# Patient Record
Sex: FEMALE | Race: WHITE | Employment: FULL TIME | URBAN - METROPOLITAN AREA
[De-identification: names, ages, dates, MRNs, and addresses within clinical notes are randomized per-mention and may not be internally consistent; named-entity substitution may affect disease eponyms.]

---

## 2017-04-19 ENCOUNTER — HOSPITAL ENCOUNTER (OUTPATIENT)
Dept: MAMMOGRAPHY | Age: 42
Discharge: HOME | End: 2017-04-19
Attending: FAMILY MEDICINE
Payer: COMMERCIAL

## 2017-04-19 DIAGNOSIS — Z12.31 VISIT FOR SCREENING MAMMOGRAM: ICD-10-CM

## 2017-04-19 PROCEDURE — 77067 SCR MAMMO BI INCL CAD: CPT

## 2018-04-20 ENCOUNTER — HOSPITAL ENCOUNTER (OUTPATIENT)
Dept: MAMMOGRAPHY | Age: 43
Discharge: HOME | End: 2018-04-20
Attending: FAMILY MEDICINE
Payer: COMMERCIAL

## 2018-04-20 DIAGNOSIS — Z12.31 VISIT FOR SCREENING MAMMOGRAM: ICD-10-CM

## 2018-04-20 PROCEDURE — 77063 BREAST TOMOSYNTHESIS BI: CPT

## 2019-07-03 ENCOUNTER — HOSPITAL ENCOUNTER (OUTPATIENT)
Dept: ULTRASOUND IMAGING | Age: 44
Discharge: HOME | End: 2019-07-03
Attending: FAMILY MEDICINE
Payer: COMMERCIAL

## 2019-07-03 DIAGNOSIS — R79.89 ABNORMAL LFTS: ICD-10-CM

## 2019-07-03 PROCEDURE — 76705 ECHO EXAM OF ABDOMEN: CPT

## 2019-10-15 ENCOUNTER — OFFICE VISIT (OUTPATIENT)
Dept: ORTHOPEDIC SURGERY | Age: 44
End: 2019-10-15

## 2019-10-15 VITALS
HEIGHT: 67 IN | HEART RATE: 91 BPM | RESPIRATION RATE: 16 BRPM | SYSTOLIC BLOOD PRESSURE: 156 MMHG | OXYGEN SATURATION: 98 % | WEIGHT: 226 LBS | BODY MASS INDEX: 35.47 KG/M2 | DIASTOLIC BLOOD PRESSURE: 96 MMHG

## 2019-10-15 DIAGNOSIS — M54.50 LUMBAR PAIN: Primary | ICD-10-CM

## 2019-10-15 DIAGNOSIS — M62.838 MUSCLE SPASM: ICD-10-CM

## 2019-10-15 DIAGNOSIS — R79.89 ELEVATED LFTS: ICD-10-CM

## 2019-10-15 DIAGNOSIS — M51.36 DDD (DEGENERATIVE DISC DISEASE), LUMBAR: ICD-10-CM

## 2019-10-15 DIAGNOSIS — M54.16 LUMBAR RADICULOPATHY: ICD-10-CM

## 2019-10-15 DIAGNOSIS — M79.2 NEURITIS: ICD-10-CM

## 2019-10-15 DIAGNOSIS — M53.3 SACROILIAC PAIN: ICD-10-CM

## 2019-10-15 PROBLEM — E66.01 SEVERE OBESITY (HCC): Status: ACTIVE | Noted: 2019-10-15

## 2019-10-15 NOTE — PATIENT INSTRUCTIONS
Low Back Arthritis: Exercises  Introduction  Here are some examples of typical rehabilitation exercises for your condition. Start each exercise slowly. Ease off the exercise if you start to have pain. Your doctor or physical therapist will tell you when you can start these exercises and which ones will work best for you. When you are not being active, find a comfortable position for rest. Some people are comfortable on the floor or a medium-firm bed with a small pillow under their head and another under their knees. Some people prefer to lie on their side with a pillow between their knees. Don't stay in one position for too long. Take short walks (10 to 20 minutes) every 2 to 3 hours. Avoid slopes, hills, and stairs until you feel better. Walk only distances you can manage without pain, especially leg pain. How to do the exercises  Pelvic tilt    1. Lie on your back with your knees bent. 2. \"Brace\" your stomach--tighten your muscles by pulling in and imagining your belly button moving toward your spine. 3. Press your lower back into the floor. You should feel your hips and pelvis rock back. 4. Hold for 6 seconds while breathing smoothly. 5. Relax and allow your pelvis and hips to rock forward. 6. Repeat 8 to 12 times. Back stretches    1. Get down on your hands and knees on the floor. 2. Relax your head and allow it to droop. Round your back up toward the ceiling until you feel a nice stretch in your upper, middle, and lower back. Hold this stretch for as long as it feels comfortable, or about 15 to 30 seconds. 3. Return to the starting position with a flat back while you are on your hands and knees. 4. Let your back sway by pressing your stomach toward the floor. Lift your buttocks toward the ceiling. 5. Hold this position for 15 to 30 seconds. 6. Repeat 2 to 4 times. Follow-up care is a key part of your treatment and safety.  Be sure to make and go to all appointments, and call your doctor if you are having problems. It's also a good idea to know your test results and keep a list of the medicines you take. Where can you learn more? Go to http://fercho-iris.info/. Enter O860 in the search box to learn more about \"Low Back Arthritis: Exercises. \"  Current as of: June 26, 2019  Content Version: 12.2  © 5413-0527 [employer], [employer]. Care instructions adapted under license by [employer] (which disclaims liability or warranty for this information). If you have questions about a medical condition or this instruction, always ask your healthcare professional. [address] any warranty or liability for your use of this information.

## 2019-10-15 NOTE — PROGRESS NOTES
[facility]  [address] [place], [address]  Phone: [phone]  Fax: [phone]    NEW PATIENT  Pt's YOB: 1975    ASSESSMENT   Diagnoses and all orders for this visit:    1. Lumbar pain  -     AMB POC XRAY, SPINE, LUMBOSACRAL; 4+  -     REFERRAL TO PHYSICAL THERAPY  -     MRI LUMB SPINE WO CONT; Future    2. DDD (degenerative disc disease), lumbar  -     REFERRAL TO PHYSICAL THERAPY  -     MRI LUMB SPINE WO CONT; Future    3. Lumbar radiculopathy  -     REFERRAL TO PHYSICAL THERAPY  -     MRI LUMB SPINE WO CONT; Future    4. Muscle spasm  -     REFERRAL TO PHYSICAL THERAPY  -     MRI LUMB SPINE WO CONT; Future    5. Sacroiliac pain  -     REFERRAL TO PHYSICAL THERAPY  -     MRI LUMB SPINE WO CONT; Future    6. Neuritis  -     REFERRAL TO PHYSICAL THERAPY  -     MRI LUMB SPINE WO CONT; Future    7. Elevated LFTs         IMPRESSION AND PLAN:  Leida Barcenas is a 38 y.o. female with history of lumbar pain. She complains of progressive weakness in the legs since she returned back to [facility] at the end of the summer. Pt notes that she is scheduled for a lower extremity EMG/NCS next week. She reports minimal relief with previous steroid injections and notes that she has not recently attended physical therapy. 1) Pt was given information on lumbar arthritis exercises. 2) A lumbar MRI was ordered. She has progressive leg weakness, difficulty standing/walking, has a previous history of a HNP, and is completing physical therapy. 3) She was referred to physical therapy. 4) Pt is not a candidate for medications at this time due to elevated LFTs. 5) I recommended the patient try OTC Aspercaine, Salonpas, or capsaicin patches if needed. 6) Ms. Alex Wade has a reminder for a \"due or due soon\" health maintenance.  I have asked that she contact her primary care provider, Leonidas Hernandez MD, for follow-up on this health maintenance. 7)  demonstrated consistency with prescribing. Follow-up and Dispositions    · Return in about 3 weeks (around 11/5/2019) for Diagnostic Test follow up, PT follow up. HISTORY OF PRESENT ILLNESS:  Inez Alcocer is a 38 y.o. female with history of lumbar pain. Pt presents to the office today as a new patient and was last seen on 04/06/2016. She complains of progressive weakness in the legs. Pt admits to pain in the lower back but notes that her leg weakness is most severe at this time. Of note, she is a an assistant . She notes that she started to experience weakness in both legs at the beginning of summer but this improved when she was off during the summer. 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 Pt reports that her job requires lifting and prolonged standing/walking. She admits to weakness in the legs when changing positions and notes that she occasional feels like she is going to fall onto a child while at work. Pt notes that she is scheduled for a lower extremity EMG/NCS next week. She reports minimal relief with previous steroid injections and notes that she has not recently attended physical therapy. Pt admits to elevated LFTs and notes that she has been instructed to avoid pain medication and alcohol. She notes that she discontinued the Neurontin since her last office visit. Pt at this time desires to proceed with a lumbar MRI and physical therapy. Of note, she is a nonsmoker.      Pain Scale: 6/10     PCP: Youlanda Jeans, MD    Past Medical History:   Diagnosis Date    Female stress incontinence     HTN (hypertension)     Hypercholesterolemia         Social History     Socioeconomic History    Marital status:      Spouse name: Not on file    Number of children: Not on file    Years of education: Not on file    Highest education level: Not on file   Occupational History    Not on file   Social Needs    Financial resource strain: Not on file    Food insecurity:     Worry: Not on file     Inability: Not on file    Transportation needs:     Medical: Not on file     Non-medical: Not on file   Tobacco Use    Smoking status: Never Smoker    Smokeless tobacco: Never Used   Substance and Sexual Activity    Alcohol use: Yes     Comment: on occ.  Drug use: No    Sexual activity: Not on file   Lifestyle    Physical activity:     Days per week: Not on file     Minutes per session: Not on file    Stress: Not on file   Relationships    Social connections:     Talks on phone: Not on file     Gets together: Not on file     Attends Caodaism service: Not on file     Active member of club or organization: Not on file     Attends meetings of clubs or organizations: Not on file     Relationship status: Not on file    Intimate partner violence:     Fear of current or ex partner: Not on file     Emotionally abused: Not on file     Physically abused: Not on file     Forced sexual activity: Not on file   Other Topics Concern    Not on file   Social History Narrative    Not on file       Current Outpatient Medications   Medication Sig Dispense Refill    losartan-hydroCHLOROthiazide (HYZAAR) 100-12.5 mg per tablet Take 1 Tab by mouth daily.  atorvastatin (LIPITOR) 20 mg tablet Take 20 mg by mouth daily.  aspirin delayed-release 81 mg tablet Take  by mouth daily.  Calcium-Cholecalciferol, D3, 600 mg(1,500mg) -400 unit cap Take  by mouth.  amLODIPine (NORVASC) 5 mg tablet Take 5 mg by mouth daily.  metoprolol succinate (TOPROL-XL) 100 mg XL tablet 100 mg daily. Allergies   Allergen Reactions    Meperidine Other (comments)     GI upset  Other reaction(s): gi distress  Hives. REVIEW OF SYSTEMS    Constitutional: Negative for fever or chills. Positive for 19 lb weight gain. Respiratory: Negative for cough or shortness of breath.      Cardiovascular: Negative for chest pain or palpitations. Gastrointestinal: Negative for acid reflux, change in bowel habits, or constipation. Genitourinary: Negative for dysuria and flank pain. Musculoskeletal: Positive for lumbar pain and bilateral leg weakness. Skin: Negative for rash. Neurological: Negative for headaches, dizziness, or numbness. Endo/Heme/Allergies: Negative for increased bruising. Psychiatric/Behavioral: Negative for difficulty with sleep. PHYSICAL EXAMINATION  Visit Vitals  BP (!) 156/96   Pulse 91   Resp 16   Ht 5' 7\" (1.702 m)   Wt 226 lb (102.5 kg)   SpO2 98%   BMI 35.40 kg/m²       Constitutional: Awake, alert, and in no acute distress. HEENT: Normocephalic. Atraumatic. Oropharynx is moist and clear. PERRL. EOMI. Sclerae are nonicteric  Cardiovascular: Regular rate and rhythm  Lungs: Clear to auscultation bilaterally  Abdomen: Soft and nontender. Bowel sounds are present  Neurological: 1+ symmetrical DTRs in the upper extremities. 1+ symmetrical DTRs in the lower extremities. Sensation to light touch is intact. Negative Hahn's sign bilaterally. Skin: warm, dry, and intact. Musculoskeletal: Tenderness to palpation in the lower lumbar region and over the right sacroiliac joint. Decreased range of motion with the KINGS's on the left; good range of motion on the right. Moderate pain with extension, axial loading, or forward flexion. No pain with internal or external rotation of her hips. Positive straight leg raise on the right. Difficulty and pain with toe and heel walking. No difficulty with the single leg stand bilaterally.       Biceps  Triceps Deltoids Wrist Ext Wrist Flex Hand Intrin   Right +4/5 +4/5 +4/5 +4/5 +4/5 +4/5   Left +4/5 +4/5 +4/5 +4/5 +4/5 +4/5      Hip Flex  Quads Hamstrings Ankle DF EHL Ankle PF   Right +4/5 +4/5 +4/5 +4/5 +4/5 +4/5   Left +4/5 +4/5 +4/5 +4/5 +4/5 +4/5     IMAGING:    Lumbar spine 4V x-rays from 10/15/2019 were personally reviewed with the patient and demonstrated:  Multilevel degenerative facets. Degenerative changes at L5-S1. Atherosclerosis in the mid aortic region. Listhesis at L5-S1. No instability. Written by Larissa Courtney, as dictated by Reji Richmond MD.  I, Dr. Reji Richmond confirm that all documentation is accurate.

## 2019-10-17 ENCOUNTER — HOSPITAL ENCOUNTER (OUTPATIENT)
Dept: MAMMOGRAPHY | Age: 44
Discharge: HOME | End: 2019-10-17
Attending: FAMILY MEDICINE
Payer: COMMERCIAL

## 2019-10-17 DIAGNOSIS — Z12.31 VISIT FOR SCREENING MAMMOGRAM: ICD-10-CM

## 2019-10-17 PROCEDURE — 77067 SCR MAMMO BI INCL CAD: CPT

## 2019-10-23 ENCOUNTER — TELEPHONE (OUTPATIENT)
Dept: ORTHOPEDIC SURGERY | Age: 44
End: 2019-10-23

## 2019-10-23 NOTE — TELEPHONE ENCOUNTER
MRI LUMB SPINE WO CONT [id number] (Order [id number])     Received call from [facility] radiology. He wanted to know if we were going to obtain the pre-auth needed for patient's MRI tomorrow 10/24/19. I advised would have clinical review and call back to advise accordingly.       Cesar can be reached at: [phone]

## 2019-10-23 NOTE — TELEPHONE ENCOUNTER
LM with Cesar in [place] to call me back as to whether a P2P is needed or what he means by pre -auth because Tye usually does the Pre-auth for the MRI?

## 2019-10-24 ENCOUNTER — HOSPITAL ENCOUNTER (OUTPATIENT)
Dept: PHYSICAL THERAPY | Age: 44
Discharge: HOME | End: 2019-10-24
Payer: COMMERCIAL

## 2019-10-24 ENCOUNTER — HOSPITAL ENCOUNTER (OUTPATIENT)
Age: 44
Discharge: HOME | End: 2019-10-24
Attending: PHYSICAL MEDICINE & REHABILITATION
Payer: COMMERCIAL

## 2019-10-24 DIAGNOSIS — M62.838 MUSCLE SPASM: ICD-10-CM

## 2019-10-24 DIAGNOSIS — M51.36 DDD (DEGENERATIVE DISC DISEASE), LUMBAR: ICD-10-CM

## 2019-10-24 DIAGNOSIS — M54.16 LUMBAR RADICULOPATHY: ICD-10-CM

## 2019-10-24 DIAGNOSIS — M54.50 LUMBAR PAIN: ICD-10-CM

## 2019-10-24 DIAGNOSIS — M53.3 SACROILIAC PAIN: ICD-10-CM

## 2019-10-24 DIAGNOSIS — M79.2 NEURITIS: ICD-10-CM

## 2019-10-24 PROCEDURE — 97161 PT EVAL LOW COMPLEX 20 MIN: CPT

## 2019-10-24 PROCEDURE — 97110 THERAPEUTIC EXERCISES: CPT

## 2019-10-24 PROCEDURE — 72148 MRI LUMBAR SPINE W/O DYE: CPT

## 2019-10-24 PROCEDURE — 97140 MANUAL THERAPY 1/> REGIONS: CPT

## 2019-10-24 NOTE — PROGRESS NOTES
[facility]  [address] [facility] [address]  [place], [address]  [phone] fax    Plan of Care/ Statement of Necessity for Physical Therapy Services  Patient name: Gómez Carroll Start of Care: 10/24/2019   Referral source: Merrill Alpers, MD : 1975    Medical Diagnosis: Low back pain [M54.5]  Sacrococcygeal disorders, not elsewhere classified [M53.3]  Other muscle spasm [M62.838]  Payor: BLUE CROSS / Plan: [employer] [address] [id number] PPO / Product Type: PPO /  Onset Date:    Treatment Diagnosis: LBP, SIJ dysfunction, B LE numbness/pain   Prior Hospitalization: see medical history Provider#: [id number]   Medications: Verified on Patient summary List    Comorbidities: HTN, arthritis, recent weight change   Prior Level of Function: Independent with ADLs, functional, and work tasks with no limitations. The Plan of Care and following information is based on the information from the initial evaluation. Assessment/ key information:   Pt is a 38year old female who presents to therapy today with LBP, SIJ dysfunction, B LE numbness/pain. Pt states that her symptoms began in 2016 with insidious onset with no mechanism. Pt reports having injections/therapy in 2016 with no significant improvement in her symptoms. Pt reports having increased pain and right>left LE weakness with prolonged standing and feels \"off balance\" at times with a sit to stand after prolonged sitting. Pt states that she feels tingling in her right>left calf and thigh and is more consistent in nature. Pt denies any bowel/bladder dysfunction. Pt demonstrated decreased AROM, decreased strength, muscle tightness, and impaired pelvic alignment. L/s EXT decreased LBP and no l/s AROM increased radicular B LE symptoms. Pelvic asymmetry noted as well in supine. Positive SLR test noted on B LEs.  Pt would benefit from physical therapy to improve the above impairments to help the pt return to performing ADLs, functional and work activities. Evaluation Complexity History MEDIUM  Complexity : 1-2 comorbidities / personal factors will impact the outcome/ POC ; Examination HIGH Complexity : 4+ Standardized tests and measures addressing body structure, function, activity limitation and / or participation in recreation  ;Presentation LOW Complexity : Stable, uncomplicated  ;Clinical Decision Making MEDIUM Complexity : FOTO score of 26-74  Overall Complexity Rating: LOW   Problem List: pain affecting function, decrease ROM, decrease strength, impaired gait/ balance, decrease ADL/ functional abilitiies, decrease activity tolerance, decrease flexibility/ joint mobility and decrease transfer abilities   Treatment Plan may include any combination of the following: Therapeutic exercise, Therapeutic activities, Neuromuscular re-education, Physical agent/modality, Gait/balance training, Manual therapy, Patient education, Self Care training, Functional mobility training, Home safety training and Stair training  Patient / Family readiness to learn indicated by: asking questions, trying to perform skills and interest  Persons(s) to be included in education: patient (P)  Barriers to Learning/Limitations: None  Patient Goal (s): pain free, able to walk better  Patient Self Reported Health Status: fair  Rehabilitation Potential: fair    Short Term Goals: To be accomplished in 1 weeks:  1. Pt will report compliance and independence to [facility] to help the pt manage their pain and symptoms. Eval: established   Long Term Goals: To be accomplished in 4 weeks:  1. Pt will increase FOTO score to 49 points to improve ability to perform ADLs. Eval: 37 points  2. Pt will increase MMT B hip ER and right hip IR to 4/5 to improve ability to tolerate work tasks. Eval: 4-/5 with all. 3. Pt will increase AROM l/s flex/B SB to WNL with minimal to no increased LBP to improve prolonged standing tolerance.   Eval: flex 50-75% of WNL, B SB WNL but all increased LBP (left SB>right SB)  4. Pt will report having no feelings of instability or pain with performing a sit to stand after prolonged sitting to improve safety and ease of transfers. Eval: reports feeling off balance and has increased pain with sit to stands after prolonged sitting. Frequency / Duration: Patient to be seen 2 times per week for 4 weeks. Patient/ Caregiver education and instruction: Diagnosis, prognosis, self care, activity modification and exercises   [x]  Plan of care has been reviewed with ANDRES Purdy, PT 10/24/2019 2:55 PM  _____________________________________________________________________  I certify that the above Therapy Services are being furnished while the patient is under my care. I agree with the treatment plan and certify that this therapy is necessary.     Physician's Signature:____________________  Date:__________Time:______    Please sign and return to [facility]  [address]  [place], [address]  [phone] fax

## 2019-10-24 NOTE — PROGRESS NOTES
PT DAILY TREATMENT NOTE  10-18    Patient Name: April Flowers  Date:10/24/2019  : 1975  [x]  Patient  Verified  Payor: BLUE CROSS / Plan: [facility] PPO / Product Type: PPO /    In time:1:59  Out time:2:45  Total Treatment Time (min): 55  Visit #: 1 of 8    Medicare/BCBS Only   Total Timed Codes (min):  28 1:1 Treatment Time:  46     Treatment Area: Low back pain [M54.5]  Sacrococcygeal disorders, not elsewhere classified [M53.3]  Other muscle spasm [M62.838]    SUBJECTIVE  Pain Level (0-10 scale): 5  Any medication changes, allergies to medications, adverse drug reactions, diagnosis change, or new procedure performed?: [x] No    [] Yes (see summary sheet for update)  Subjective functional status/changes:   [] No changes reported  See POC    OBJECTIVE    18 min [x]Eval                  []Re-Eval     8 min Therapeutic Exercise:  [] See flow sheet : HEP instruction and demonstration, pt education on performing repeated l/s EXT AROM when her symptoms increase to improve pain   Rationale: increase ROM and increase strength to improve the patients ability to tolerate ADLs    8 min Therapeutic Activity:  []  See flow sheet : pt education regarding anatomy and physiology of the spine/LEs/SIJ and how it relates to the pt's condition, pt education on using heat/ice for 15-20 mins as needed to decrease pain/symptoms, pt education on avoiding activities that increase her peripheral/radicular pain/symptoms   Rationale: increase ROM, increase strength and decrease pain/symptoms  to improve the patients ability to tolerate functional tasks.     12 min Manual Therapy: MET to correct right posterior innominate x 2, MET to correct right sacral torsion in left s/l, MET to correct right anterior innominate, pelvic alignment and leg length assessments, TPR left psoas pt education on how the pelvic alignment can affect her pain/symptoms   Rationale: decrease pain, increase ROM and increase tissue extensibility to improve activity tolerance. With   [x] TE   [x] TA   [] neuro   [x] other: manual Patient Education: [x] Review HEP    [] Progressed/Changed HEP based on:   [] positioning   [] body mechanics   [] transfers   [] heat/ice application    [] other:      Other Objective/Functional Measures: See evaluation. Some improvement in pelvic alignment noted after MET to correct the right posterior innominate x 2 but continued to demonstrate pelvic asymmetry. Tenderness noted to the left psoas during manual interventions    Pain Level (0-10 scale) post treatment: 6    ASSESSMENT/Changes in Function: Pt given HEP handout to perform. Pt understood exercises in HEP handout. Pt demonstrated decreased AROM, decreased strength, muscle tightness, and impaired pelvic alignment. L/s EXT decreased LBP and no l/s AROM increased radicular B LE symptoms. Pelvic asymmetry noted as well in supine. Positive SLR test noted on B LEs. Pt would benefit from physical therapy to improve the above impairments to help the pt return to performing ADLs, functional and work activities. Patient will continue to benefit from skilled PT services to modify and progress therapeutic interventions, address functional mobility deficits, address ROM deficits, address strength deficits, analyze and address soft tissue restrictions, analyze and cue movement patterns, analyze and modify body mechanics/ergonomics, assess and modify postural abnormalities, address imbalance/dizziness and instruct in home and community integration to attain remaining goals. [x]  See Plan of Care  []  See progress note/recertification  []  See Discharge Summary         Progress towards goals / Updated goals:  Short Term Goals: To be accomplished in 1 weeks:  1. Pt will report compliance and independence to HEP to help the pt manage their pain and symptoms. Eval: established   Long Term Goals: To be accomplished in 4 weeks:  1.  Pt will increase FOTO score to 49 points to improve ability to perform ADLs. Eval: 37 points  2. Pt will increase MMT B hip ER and right hip IR to 4/5 to improve ability to tolerate work tasks. Eval: 4-/5 with all. 3. Pt will increase AROM l/s flex/B SB to WNL with minimal to no increased LBP to improve prolonged standing tolerance. Eval: flex 50-75% of WNL, B SB WNL but all increased LBP (left SB>right SB)  4. Pt will report having no feelings of instability or pain with performing a sit to stand after prolonged sitting to improve safety and ease of transfers. Eval: reports feeling off balance and has increased pain with sit to stands after prolonged sitting.     PLAN  [x]  Upgrade activities as tolerated     [x]  Continue plan of care  [x]  Update interventions per flow sheet       []  Discharge due to:_  []  Other:_      Chrissy Chapin, PT 10/24/2019  2:56 PM    Future Appointments   Date Time Provider Tray June   10/24/2019  2:00 PM Jimmy Hanson PT [id number]   10/24/2019  5:00 PM HBV MRI RM 1 [facility]   10/25/2019  8:45 AM HBV JAYCOB RM 3 3D HBVRMAM [facility]   10/25/2019 10:00 AM HBV JAYCOB US RM 1 [facility]US [facility]   12/4/2019 10:00 AM Alexsander Patel  [address]

## 2019-10-25 ENCOUNTER — HOSPITAL ENCOUNTER (OUTPATIENT)
Dept: MAMMOGRAPHY | Age: 44
Discharge: HOME | End: 2019-10-25
Attending: FAMILY MEDICINE
Payer: COMMERCIAL

## 2019-10-25 ENCOUNTER — HOSPITAL ENCOUNTER (OUTPATIENT)
Dept: ULTRASOUND IMAGING | Age: 44
Discharge: HOME | End: 2019-10-25
Attending: FAMILY MEDICINE
Payer: COMMERCIAL

## 2019-10-25 DIAGNOSIS — R92.8 ABNORMAL MAMMOGRAM: ICD-10-CM

## 2019-10-25 DIAGNOSIS — N64.89 BREAST ASYMMETRY: ICD-10-CM

## 2019-10-25 PROCEDURE — 76642 ULTRASOUND BREAST LIMITED: CPT

## 2019-10-25 PROCEDURE — 77061 BREAST TOMOSYNTHESIS UNI: CPT

## 2019-10-28 ENCOUNTER — HOSPITAL ENCOUNTER (OUTPATIENT)
Dept: PHYSICAL THERAPY | Age: 44
Discharge: HOME | End: 2019-10-28
Payer: COMMERCIAL

## 2019-10-28 PROCEDURE — 97140 MANUAL THERAPY 1/> REGIONS: CPT

## 2019-10-28 NOTE — PROGRESS NOTES
PT DAILY TREATMENT NOTE 10-18    Patient Name: Margot Houston  Date:10/28/2019  : 1975  [x]  Patient  Verified  Payor: BLUE CROSS / Plan: [facility] PPO / Product Type: PPO /    In time:3:30  Out time:4:26  Total Treatment Time (min): 56  Visit #: 2 of 8    Medicare/BCBS Only   Total Timed Codes (min):  46 1:1 Treatment Time:  20       Treatment Area: Low back pain [M54.5]  Sacrococcygeal disorders, not elsewhere classified [M53.3]  Other muscle spasm [M62.838]    SUBJECTIVE  Pain Level (0-10 scale): 5/10  Any medication changes, allergies to medications, adverse drug reactions, diagnosis change, or new procedure performed?: [x] No    [] Yes (see summary sheet for update)  Subjective functional status/changes:   [x] No changes reported    OBJECTIVE    Modality rationale: decrease pain and increase tissue extensibility to improve the patients ability to perform ADL's. Min Type Additional Details    [] Estim:  []Unatt       []IFC  []Premod                        []Other:  []w/ice   []w/heat  Position:  Location:    [] Estim: []Att    []TENS instruct  []NMES                    []Other:  []w/US   []w/ice   []w/heat  Position:  Location:    []  Traction: [] Cervical       []Lumbar                       [] Prone          []Supine                       []Intermittent   []Continuous Lbs:  [] before manual  [] after manual    []  Ultrasound: []Continuous   [] Pulsed                           []1MHz   []3MHz W/cm2:  Location:    []  Iontophoresis with dexamethasone         Location: [] Take home patch   [] In clinic   10 []  Ice     [x]  heat  []  Ice massage  []  Laser   []  Anodyne Position: Supine with wedge under LE's.   Location: Low back    []  Laser with stim  []  Other:  Position:  Location:    []  Vasopneumatic Device Pressure:       [] lo [] med [] hi   Temperature: [] lo [] med [] hi   [] Skin assessment post-treatment:  []intact []redness- no adverse reaction    []redness - adverse reaction:     38 min Therapeutic Exercise:  [x] See flow sheet :   Rationale: increase ROM, increase strength and increase proprioception to improve the patients ability to perform ADL's.    8 min Manual Therapy:  MET and shotgun technique to correct Right SI posterior rotation. STM (B) piriformis and (B) QL. TPR Left psoas. Rationale: decrease pain, increase ROM, increase tissue extensibility and decrease trigger points to improve quality of life and ease of performing functional tasks. With   [x] TE   [] TA   [] neuro   [] other: Patient Education: [x] Review HEP    [] Progressed/Changed HEP based on:   [] positioning   [] body mechanics   [] transfers   [] heat/ice application    [] other:      Other Objective/Functional Measures: Initiated exercises per flow sheet. Pain Level (0-10 scale) post treatment: 5/10    ASSESSMENT/Changes in Function: First F/U visit. Hypersensitive with palpation and STM to mm's. Pt reported no significant change in symptoms post-treatment. Continue PT to decrease mm restrictions, increase core/(B) hip strength to improve ease of performing functional tasks. Patient will continue to benefit from skilled PT services to modify and progress therapeutic interventions, address functional mobility deficits, address ROM deficits, address strength deficits, analyze and address soft tissue restrictions and analyze and modify body mechanics/ergonomics to attain remaining goals. [x]  See Plan of Care  []  See progress note/recertification  []  See Discharge Summary         Progress towards goals / Updated goals:  Short Term Goals: To be accomplished in 1 weeks:  1. Pt will report compliance and independence to HEP to help the pt manage their pain and symptoms.             Eval: established   Current: has not initiated at this time. 10/28/2019  Long Term Goals: To be accomplished in 4 weeks:  1.  Pt will increase FOTO score to 49 points to improve ability to perform ADLs.  Eval: 37 points  2. Pt will increase MMT B hip ER and right hip IR to 4/5 to improve ability to tolerate work tasks. Eval: 4-/5 with all.   3. Pt will increase AROM l/s flex/B SB to WNL with minimal to no increased LBP to improve prolonged standing tolerance. Eval: flex 50-75% of WNL, B SB WNL but all increased LBP (left SB>right SB)  4. Pt will report having no feelings of instability or pain with performing a sit to stand after prolonged sitting to improve safety and ease of transfers. Eval: reports feeling off balance and has increased pain with sit to stands after prolonged sitting.     PLAN  []  Upgrade activities as tolerated     [x]  Continue plan of care  []  Update interventions per flow sheet       []  Discharge due to:_  []  Other:_      Inez Rushing PTA 10/28/2019  3:32 PM    Future Appointments   Date Time Provider Tray June   11/1/2019  3:30 PM Juan A, [facility]   11/4/2019  4:00 PM Jennifer Skinner PTA [facility]   11/6/2019  3:30 PM Jennifer Skinner PTA [facility]   11/11/2019  3:30 PM Garry Rosado PTA [facility]   11/18/2019  3:30 PM Juan A, [facility]   12/4/2019 10:00 AM Herber Ram  [address]

## 2019-10-30 ENCOUNTER — TELEPHONE (OUTPATIENT)
Dept: ORTHOPEDIC SURGERY | Age: 44
End: 2019-10-30

## 2019-10-30 DIAGNOSIS — N94.89 ADNEXAL MASS: Primary | ICD-10-CM

## 2019-10-30 NOTE — PROGRESS NOTES
Attempted to contact patient, Reached voicemail identified as \"Sheri\", left message, identified myself/facility/callback number, requested return call to facility.

## 2019-10-30 NOTE — PROGRESS NOTES
Returned call to patient, verified Name/, informed patient of message per Dr. Jami Ontiveros. Patient verbalized agreement/understanding. Patient would like to have ultrasound at [facility] location.

## 2019-10-30 NOTE — TELEPHONE ENCOUNTER
Returned call to patient, verified Name/, informed patient of message per Dr. Geovany Lewis. Patient verbalized agreement/understanding. Patient would like to have ultrasound at [facility] location. VORB placed for Pelv US Non OBS as per Dr. Geovany Lewis for adnexal mass. No further action required at this time.

## 2019-10-30 NOTE — TELEPHONE ENCOUNTER
10/30/2019  Patient called back to speak with Bobo Young who called her today.  (see Bobo Young call message in Progess Notes)  Please call patient back today at [phone]

## 2019-10-30 NOTE — TELEPHONE ENCOUNTER
Patient called and said she received a call from Dr. Taylor Bernal office. Patient is requesting that whoever called her to please call her back at tel. [phone].

## 2019-11-01 ENCOUNTER — HOSPITAL ENCOUNTER (OUTPATIENT)
Dept: PHYSICAL THERAPY | Age: 44
Discharge: HOME | End: 2019-11-01
Payer: COMMERCIAL

## 2019-11-01 PROCEDURE — 97112 NEUROMUSCULAR REEDUCATION: CPT

## 2019-11-01 PROCEDURE — 97110 THERAPEUTIC EXERCISES: CPT

## 2019-11-01 NOTE — PROGRESS NOTES
PT DAILY TREATMENT NOTE 10-18    Patient Name: Chase Barrett  Date:2019  : 1975  [x]  Patient  Verified  Payor: BLUE CROSS / Plan: [facility] PPO / Product Type: PPO /    In time:3:30  Out time:4:03  Total Treatment Time (min): 33  Visit #: 3 of 8    Medicare/BCBS Only   Total Timed Codes (min):  33 1:1 Treatment Time:  33       Treatment Area: Low back pain [M54.5]  Sacrococcygeal disorders, not elsewhere classified [M53.3]  Other muscle spasm [M62.838]    SUBJECTIVE  Pain Level (0-10 scale): 7  Any medication changes, allergies to medications, adverse drug reactions, diagnosis change, or new procedure performed?: [x] No    [] Yes (see summary sheet for update)  Subjective functional status/changes:   [] No changes reported  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 and she is just irritated through the right side of her back    OBJECTIVE    Modality rationale: PD to improve the patients ability to    Min Type Additional Details    [] Estim:  []Unatt       []IFC  []Premod                        []Other:  []w/ice   []w/heat  Position:  Location:    [] Estim: []Att    []TENS instruct  []NMES                    []Other:  []w/US   []w/ice   []w/heat  Position:  Location:    []  Traction: [] Cervical       []Lumbar                       [] Prone          []Supine                       []Intermittent   []Continuous Lbs:  [] before manual  [] after manual    []  Ultrasound: []Continuous   [] Pulsed                           []1MHz   []3MHz W/cm2:  Location:    []  Iontophoresis with dexamethasone         Location: [] Take home patch   [] In clinic    []  Ice     []  heat  []  Ice massage  []  Laser   []  Anodyne Position:  Location:    []  Laser with stim  []  Other:  Position:  Location:    []  Vasopneumatic Device Pressure:       [] lo [] med [] hi   Temperature: [] lo [] med [] hi   [] Skin assessment post-treatment:  []intact []redness- no adverse reaction    []redness  adverse reaction:     15 min Therapeutic Exercise:  [] See flow sheet :   Rationale: increase ROM and increase strength to improve the patients ability to perform daily tasks and self care with increased ease     10 min Neuromuscular Re-education:  []  See flow sheet : supine core activation exercises   Rationale: improve coordination and increase proprioception  to improve the patients ability to perform functional tasks with improved core activation and stability    8 min Manual Therapy:  Right posterior innominate MET with shotgun technique; right hip inferior mobs in Fig-4 with mulligan strap grade II   Rationale: decrease pain and increase tissue extensibility to improve ADL ease with reduced pain levels         With   [] TE   [] TA   [] neuro   [] other: Patient Education: [x] Review HEP    [] Progressed/Changed HEP based on:   [] positioning   [] body mechanics   [] transfers   [] heat/ice application    [] other:      Other Objective/Functional Measures: pt demonstrates improved ability to perform bridges with addition of pink powerband pull down to encourage anterior chain activation     Pain Level (0-10 scale) post treatment: 7    ASSESSMENT/Changes in Function: Pt still reporting increased pain through the right side of her lumbar spine and SI region. She does report that she felt improvement the day after her last therapy session. Continue to improve anterior chain and core activation/strength for reduced back pain with ADLs    Patient will continue to benefit from skilled PT services to modify and progress therapeutic interventions, address functional mobility deficits, address ROM deficits, address strength deficits, analyze and address soft tissue restrictions, analyze and cue movement patterns and analyze and modify body mechanics/ergonomics to attain remaining goals.      []  See Plan of Care  []  See progress note/recertification  []  See Discharge Summary         Progress towards goals / Updated goals:  Short Term Goals: To be accomplished in 1 weeks:  1. Pt will report compliance and independence to HEP to help the pt manage their pain and symptoms.             Eval: established   Current: has not initiated at this time. 10/28/2019  Long Term Goals: To be accomplished in 4 weeks:  1. Pt will increase FOTO score to 49 points to improve ability to perform ADLs. Eval: 37 points  2. Pt will increase MMT B hip ER and right hip IR to 4/5 to improve ability to tolerate work tasks. Eval: 4-/5 with all.   3. Pt will increase AROM l/s flex/B SB to WNL with minimal to no increased LBP to improve prolonged standing tolerance. Eval: flex 50-75% of WNL, B SB WNL but all increased LBP (left SB>right SB)  4. Pt will report having no feelings of instability or pain with performing a sit to stand after prolonged sitting to improve safety and ease of transfers. Eval: reports feeling off balance and has increased pain with sit to stands after prolonged sitting.     PLAN  []  Upgrade activities as tolerated     []  Continue plan of care  []  Update interventions per flow sheet       []  Discharge due to:_  []  Other:_      Jimmy Amor DPT, CMTPT 11/1/2019  3:34 PM    Future Appointments   Date Time Provider Tray June   11/4/2019  4:00 PM [facility]   11/5/2019  2:45 PM [facility] US RM 1 [facility]   11/6/2019  3:30 PM [facility]   11/11/2019  3:30 PM [facility]   11/18/2019  3:30 PM Juan A, [address] [facility]   12/4/2019 10:00 AM Calli Leung  [address]

## 2019-11-04 ENCOUNTER — HOSPITAL ENCOUNTER (OUTPATIENT)
Dept: PHYSICAL THERAPY | Age: 44
Discharge: HOME | End: 2019-11-04
Payer: COMMERCIAL

## 2019-11-04 PROCEDURE — 97140 MANUAL THERAPY 1/> REGIONS: CPT

## 2019-11-04 PROCEDURE — 97110 THERAPEUTIC EXERCISES: CPT

## 2019-11-04 NOTE — PROGRESS NOTES
PT DAILY TREATMENT NOTE 10-18    Patient Name: Karen Roach  Date:2019  : 1975  [x]  Patient  Verified  Payor: BLUE CROSS / Plan: [facility][1] PPO / Product Type: PPO /    In time:4:00  Out time:4:28  Total Treatment Time (min): 28  Visit #: 4 of 8    Medicare/BCBS Only   Total Timed Codes (min):  28 1:1 Treatment Time:  28       Treatment Area: Low back pain [M54.5]  Sacrococcygeal disorders, not elsewhere classified [M53.3]  Other muscle spasm [M62.838]    SUBJECTIVE  Pain Level (0-10 scale): 4/10  Any medication changes, allergies to medications, adverse drug reactions, diagnosis change, or new procedure performed?: [x] No    [] Yes (see summary sheet for update)  Subjective functional status/changes:   [] No changes reported  Pt reports complaints of soreness all weekend but it has subsided today. OBJECTIVE    20 min Therapeutic Exercise:  [x] See flow sheet :   Rationale: increase ROM and increase strength to improve the patients ability to tolerate ADLs. 8 min Manual Therapy:  Pelvic alignment assessment in supine, sTM to right glute, piriformis in prone   Rationale: decrease pain, increase ROM and increase tissue extensibility to perform ADLs with increased ease. With   [] TE   [] TA   [] neuro   [] other: Patient Education: [x] Review HEP    [] Progressed/Changed HEP based on:   [] positioning   [] body mechanics   [] transfers   [] heat/ice application    [] other:      Other Objective/Functional Measures: Neutral pelvic alignment. Pain Level (0-10 scale) post treatment: 3/10    ASSESSMENT/Changes in Function: Pt demonstrates good glute squeeze. Continued antalgic gait pattern post treatment.      Patient will continue to benefit from skilled PT services to modify and progress therapeutic interventions, address functional mobility deficits, address ROM deficits, address strength deficits, analyze and address soft tissue restrictions and analyze and cue movement patterns to attain remaining goals. []  See Plan of Care  []  See progress note/recertification  []  See Discharge Summary         Progress towards goals / Updated goals:  Short Term Goals: To be accomplished in 1 weeks:  1. Pt will report compliance and independence to HEP to help the pt manage their pain and symptoms.             Eval: established   Current: has not initiated at this time. 10/28/2019  Long Term Goals: To be accomplished in 4 weeks:  1. Pt will increase FOTO score to 49 points to improve ability to perform ADLs. Eval: 37 points  2. Pt will increase MMT B hip ER and right hip IR to 4/5 to improve ability to tolerate work tasks. Eval: 4-/5 with all.   3. Pt will increase AROM l/s flex/B SB to WNL with minimal to no increased LBP to improve prolonged standing tolerance. Eval: flex 50-75% of WNL, B SB WNL but all increased LBP (left SB>right SB)  4. Pt will report having no feelings of instability or pain with performing a sit to stand after prolonged sitting to improve safety and ease of transfers. Eval: reports feeling off balance and has increased pain with sit to stands after prolonged sitting.     PLAN  []  Upgrade activities as tolerated     [x]  Continue plan of care  []  Update interventions per flow sheet       []  Discharge due to:_  []  Other:_      Michelle Leon PTA 11/4/2019  4:15 PM    Future Appointments   Date Time Provider Tray June   11/5/2019  2:45 PM [facility] US RM 1 [facility]   11/6/2019  3:30 PM Anup Orozco PTA [facility]   11/11/2019  3:30 PM Trena Mccoy PTA [facility]   11/18/2019  3:30 PM Juan A, [address] [facility]   12/4/2019 10:00 AM Aleksey Cornell  [address]

## 2019-11-05 ENCOUNTER — HOSPITAL ENCOUNTER (OUTPATIENT)
Dept: ULTRASOUND IMAGING | Age: 44
Discharge: HOME | End: 2019-11-05
Attending: PHYSICAL MEDICINE & REHABILITATION
Payer: COMMERCIAL

## 2019-11-05 DIAGNOSIS — N94.89 ADNEXAL MASS: ICD-10-CM

## 2019-11-05 PROCEDURE — 76856 US EXAM PELVIC COMPLETE: CPT

## 2019-11-06 ENCOUNTER — HOSPITAL ENCOUNTER (OUTPATIENT)
Dept: PHYSICAL THERAPY | Age: 44
Discharge: HOME | End: 2019-11-06
Payer: COMMERCIAL

## 2019-11-06 PROCEDURE — 97140 MANUAL THERAPY 1/> REGIONS: CPT

## 2019-11-06 PROCEDURE — 97110 THERAPEUTIC EXERCISES: CPT

## 2019-11-06 NOTE — PROGRESS NOTES
PT DAILY TREATMENT NOTE 10-18    Patient Name: Leroy Rehman  Date:2019  : 1975  [x]  Patient  Verified  Payor: BLUE CROSS / Plan: [facility] PPO / Product Type: PPO /    In time:3:30  Out time:4:00  Total Treatment Time (min): 30  Visit #: 5 of 8    Medicare/BCBS Only   Total Timed Codes (min):  30 1:1 Treatment Time:  30       Treatment Area: Low back pain [M54.5]  Sacrococcygeal disorders, not elsewhere classified [M53.3]  Other muscle spasm [M62.838]    SUBJECTIVE  Pain Level (0-10 scale): 3/10  Any medication changes, allergies to medications, adverse drug reactions, diagnosis change, or new procedure performed?: [x] No    [] Yes (see summary sheet for update)  Subjective functional status/changes:   [] No changes reported  Pt has reports of improved ability to walk during the day with decreased pain. Pt reports compliance with HEP. OBJECTIVE    20 min Therapeutic Exercise:  [x] See flow sheet :   Rationale: increase ROM and increase strength to improve the patients ability to tolerate ADLs. 10 min Manual Therapy:  DTm to right glute, piriformis, QL with patient in prone   Rationale: decrease pain, increase ROM and increase tissue extensibility to improve tolerance to ADLs. With   [] TE   [] TA   [] neuro   [] other: Patient Education: [x] Review HEP    [] Progressed/Changed HEP based on:   [] positioning   [] body mechanics   [] transfers   [] heat/ice application    [] other:      Other Objective/Functional Measures: Pt has neutral pelvic alignment. Pain Level (0-10 scale) post treatment: 3/10    ASSESSMENT/Changes in Function: Pt continues to demonstrate improved pelvic stability with neutral alignment.      Patient will continue to benefit from skilled PT services to modify and progress therapeutic interventions, address functional mobility deficits, address ROM deficits, address strength deficits, analyze and address soft tissue restrictions, analyze and cue movement patterns and analyze and modify body mechanics/ergonomics to attain remaining goals. []  See Plan of Care  []  See progress note/recertification  []  See Discharge Summary         Progress towards goals / Updated goals:  Short Term Goals: To be accomplished in 1 weeks:  1. Pt will report compliance and independence to HEP to help the pt manage their pain and symptoms.             Eval: established   Current: has not initiated at this time. 10/28/2019  Long Term Goals: To be accomplished in 4 weeks:  1. Pt will increase FOTO score to 49 points to improve ability to perform ADLs. Eval: 37 points  2. Pt will increase MMT B hip ER and right hip IR to 4/5 to improve ability to tolerate work tasks. Eval: 4-/5 with all.   3. Pt will increase AROM l/s flex/B SB to WNL with minimal to no increased LBP to improve prolonged standing tolerance. Eval: flex 50-75% of WNL, B SB WNL but all increased LBP (left SB>right SB)  4. Pt will report having no feelings of instability or pain with performing a sit to stand after prolonged sitting to improve safety and ease of transfers. Eval: reports feeling off balance and has increased pain with sit to stands after prolonged sitting.     PLAN  []  Upgrade activities as tolerated     [x]  Continue plan of care  []  Update interventions per flow sheet       []  Discharge due to:_  []  Other:_      Judy Solomon PTA 11/6/2019  4:09 PM    Future Appointments   Date Time Provider Tray Cardi   11/11/2019  3:30 PM Blanche Torres PTA [facility]   11/18/2019  3:30 PM Juan A [address] [facility]   12/4/2019 10:00 AM Pura Rogers  [address]

## 2019-11-12 ENCOUNTER — PATIENT MESSAGE (OUTPATIENT)
Dept: ORTHOPEDIC SURGERY | Age: 44
End: 2019-11-12

## 2019-11-12 NOTE — TELEPHONE ENCOUNTER
From: Karen Roach  To: Dru Landers MD  Sent: 11/12/2019 9:29 AM EST  Subject: Non-Urgent Medical Question    Good Morning,  I had an ultrasound done Tuesday, November 5th for something that you seen on my MRI. I was wondering what the results are from that since I hadn't heard anything.  Thanks for your time and all that you do,  Kraig Lung

## 2019-11-13 ENCOUNTER — APPOINTMENT (OUTPATIENT)
Dept: PHYSICAL THERAPY | Age: 44
End: 2019-11-13
Payer: COMMERCIAL

## 2019-11-18 ENCOUNTER — HOSPITAL ENCOUNTER (OUTPATIENT)
Dept: PHYSICAL THERAPY | Age: 44
Discharge: HOME | End: 2019-11-18
Payer: COMMERCIAL

## 2019-11-18 PROCEDURE — 97112 NEUROMUSCULAR REEDUCATION: CPT

## 2019-11-18 PROCEDURE — 97110 THERAPEUTIC EXERCISES: CPT

## 2019-11-18 NOTE — PROGRESS NOTES
[facility]  [address]  [place], [address]  [phone] fax    Physical Therapy Progress Note  Patient name: Nemo Reed Start of Care: 10/24/2019   Referral source: Rocael Girard MD : 1975               Medical Diagnosis: Low back pain [M54.5]  Sacrococcygeal disorders, not elsewhere classified [M53.3]  Other muscle spasm [M62.838]  Payor: BLUE CROSS / Plan: [employer] [id number] PPO / Product Type: PPO /  Onset Date:               Treatment Diagnosis: LBP, SIJ dysfunction, B LE numbness/pain   Prior Hospitalization: see medical history Provider#: [id number]   Medications: Verified on Patient summary List    Comorbidities: HTN, arthritis, recent weight change   Prior Level of Function: Independent with ADLs, functional, and work tasks with no limitations. Visits from Start of Care: 6    Missed Visits: 1      Established Goals:        Excellent         Good         Limited            None  [x] Increased ROM   []  [x]  []  []  [x] Increased Strength  []  [x]  []  []  [] Increased Mobility  []  []  []  []   [x] Decreased Pain   []  [x]  []  []  [] Decreased Swelling  []  []  []  []    Key Functional Changes:   Short Term Goals: To be accomplished in 1 weeks:  1. Pt will report compliance and independence to [facility] to help the pt manage their pain and symptoms.             Eval: established   Current: has not initiated at this time. 10/28/2019  Long Term Goals: To be accomplished in 4 weeks:  1. Pt will increase FOTO score to 49 points to improve ability to perform ADLs. Eval: 37 points  Current: Met- 60% 19  2. Pt will increase MMT B hip ER and right hip IR to 4/5 to improve ability to tolerate work tasks. Eval: 4-/5 with all. Current: Met- 4/5 IR 4+/5 ER right,  4+/5 left hip ER 19   3.  Pt will increase AROM l/s flex/B SB to WNL with minimal to no increased LBP to improve prolonged standing tolerance. Eval: flex 50-75% of WNL, B SB WNL but all increased LBP (left SB>right SB)  Current: partially met- WNL SB void of pain pain with flexion at ~60% WNL 11/18/19  4. Pt will report having no feelings of instability or pain with performing a sit to stand after prolonged sitting to improve safety and ease of transfers. Eval: reports feeling off balance and has increased pain with sit to stands after prolonged sitting. Updated Goals: to be achieved in 3-4 weeks:  1. Pt will report having no feelings of instability or pain with performing a sit to stand after prolonged sitting to improve safety and ease of transfers. PN: reports feeling off balance and has increased pain with sit to stands after prolonged sitting. 2. Pt will demonstrate trunk flexion fingertips to ankles without pain increase to improve ease of ADLs. PN: fingertips to tibial tuberosity with pain  3. Pt will demonstrate stability ball bridge without back pain to improve co-contraction of core musculature to improve standing activity. PN: back discomfort with SB bridge    ASSESSMENT/RECOMMENDATIONS:  Pt does report improvement since [facility] and states that today was just a bit of aggravation secondary to work. She has been demonstrating improved pain levels prior to today's visit.  Will continue PT for 3 more weeks to attempt further pain and lumbopelvic stability progression    [x]Continue therapy per initial plan/protocol at a frequency of  2 x per week for 3-4 weeks  []Continue therapy with the following recommended changes:_____________________      _____________________________________________________________________  []Discontinue therapy progressing towards or have reached established goals  []Discontinue therapy due to lack of appreciable progress towards goals  []Discontinue therapy due to lack of attendance or compliance  []Await Physician's recommendations/decisions regarding therapy  []Other:________________________________________________________________    Thank you for this referral.    Court Taylor NAHED, CMTPT 11/18/2019 5:51 PM  NOTE TO PHYSICIAN:  PLEASE COMPLETE THE ORDERS BELOW AND   FAX TO [facility]: [phone]  If you are unable to process this request in 24 hours please contact our office: [phone]    ? I have read the above report and request that my patient continue as recommended. ? I have read the above report and request that my patient continue therapy with the following changes/special instructions:__________________________________________________________  ? I have read the above report and request that my patient be discharged from therapy.     Physicians signature: ______________________________Date: ______Time:______

## 2019-11-18 NOTE — PROGRESS NOTES
PT DAILY TREATMENT NOTE 10-18    Patient Name: Marina Dockery  Date:2019  : 1975  [x]  Patient  Verified  Payor: BLUE CROSS / Plan: [facility][1] PPO / Product Type: PPO /    In time:3:29  Out time:4:02  Total Treatment Time (min): 33  Visit #: 6 of 8    Medicare/BCBS Only   Total Timed Codes (min):  33 1:1 Treatment Time:  29       Treatment Area: Low back pain [M54.5]  Sacrococcygeal disorders, not elsewhere classified [M53.3]  Other muscle spasm [M62.838]    SUBJECTIVE  Pain Level (0-10 scale): 6-7  Any medication changes, allergies to medications, adverse drug reactions, diagnosis change, or new procedure performed?: [x] No    [] Yes (see summary sheet for update)  Subjective functional status/changes:   [] No changes reported  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 and is thus pretty aggravated through her back    OBJECTIVE    Modality rationale: PD to improve the patients ability to    Min Type Additional Details    [] Estim:  []Unatt       []IFC  []Premod                        []Other:  []w/ice   []w/heat  Position:  Location:    [] Estim: []Att    []TENS instruct  []NMES                    []Other:  []w/US   []w/ice   []w/heat  Position:  Location:    []  Traction: [] Cervical       []Lumbar                       [] Prone          []Supine                       []Intermittent   []Continuous Lbs:  [] before manual  [] after manual    []  Ultrasound: []Continuous   [] Pulsed                           []1MHz   []3MHz W/cm2:  Location:    []  Iontophoresis with dexamethasone         Location: [] Take home patch   [] In clinic    []  Ice     []  heat  []  Ice massage  []  Laser   []  Anodyne Position:  Location:    []  Laser with stim  []  Other:  Position:  Location:    []  Vasopneumatic Device Pressure:       [] lo [] med [] hi   Temperature: [] lo [] med [] hi   [] Skin assessment post-treatment:  []intact []redness- no adverse reaction    []redness  adverse reaction:       18 min Therapeutic Exercise:  [] See flow sheet :   Rationale: increase ROM and increase strength to improve the patients ability to perform daily tasks and work duties with increased ease     15 min Neuromuscular Re-education:  []  See flow sheet :   Rationale: improve coordination and increase proprioception  to improve the patients ability to perform functional tasks with improved core stability        With   [] TE   [] TA   [] neuro   [] other: Patient Education: [x] Review HEP    [] Progressed/Changed HEP based on:   [] positioning   [] body mechanics   [] transfers   [] heat/ice application    [] other:      Other Objective/Functional Measures: added barrel 1/2 prone hip exercises, fair ability noted as pt still has difficulty relaxing back due to pain     Pain Level (0-10 scale) post treatment: 5    ASSESSMENT/Changes in Function: Pt does report improvement since [facility] and states that today was just a bit of aggravation secondary to work. She has been demonstrating improved pain levels prior to today's visit. Will continue PT for 3 more weeks to attempt further pain and lumbopelvic stability progression    Patient will continue to benefit from skilled PT services to modify and progress therapeutic interventions, address functional mobility deficits, address ROM deficits, address strength deficits, analyze and address soft tissue restrictions, analyze and cue movement patterns and analyze and modify body mechanics/ergonomics to attain remaining goals. []  See Plan of Care  [x]  See progress note/recertification  []  See Discharge Summary         Progress towards goals / Updated goals:  Short Term Goals: To be accomplished in 1 weeks:  1. Pt will report compliance and independence to HEP to help the pt manage their pain and symptoms.             Eval: established   Current: has not initiated at this time. 10/28/2019  Long Term Goals: To be accomplished in 4 weeks:  1.  Pt will increase FOTO score to 49 points to improve ability to perform ADLs. Eval: 37 points  Current: Met- 60% 11/18/19  2. Pt will increase MMT B hip ER and right hip IR to 4/5 to improve ability to tolerate work tasks. Eval: 4-/5 with all. Current: Met- 4/5 IR 4+/5 ER right,  4+/5 left hip ER 11/18/19   3. Pt will increase AROM l/s flex/B SB to WNL with minimal to no increased LBP to improve prolonged standing tolerance. Eval: flex 50-75% of WNL, B SB WNL but all increased LBP (left SB>right SB)  Current: partially met- WNL SB void of pain pain with flexion at ~60% WNL 11/18/19  4. Pt will report having no feelings of instability or pain with performing a sit to stand after prolonged sitting to improve safety and ease of transfers. Eval: reports feeling off balance and has increased pain with sit to stands after prolonged sitting.       PLAN  []  Upgrade activities as tolerated     [x]  Continue plan of care  []  Update interventions per flow sheet       []  Discharge due to:_  []  Other:_      Kaya Guajardo DPT, CMTPT 11/18/2019  3:41 PM    Future Appointments   Date Time Provider Tray June   12/4/2019 10:00 AM Clarence Buchanan  [address]

## 2019-11-21 NOTE — TELEPHONE ENCOUNTER
I called pt to review the results of the pelvic ultrasound-- she has a septated partially cystic mass which may be coming from her remaining ovary. She has had an ovary removed and her uterus removed. I recommended she follow up with her gyn for further evaluation. She agrees and had no further questions.

## 2019-12-30 NOTE — PROGRESS NOTES
[facility]  [address]  [place], [address]  [phone] fax    Physical Therapy Discharge Summary  Patient name: Francoise Hyatt Start of Care: 10/24/2019   Referral source: Robyn Barone MD : 1975               Medical Diagnosis: Low back pain [M54.5]  Sacrococcygeal disorders, not elsewhere classified [M53.3]  Other muscle spasm [M62.838]  Payor: BLUE CROSS / Plan: [employer] [id number] PPO / Product Type: PPO /  Onset Date:               Treatment Diagnosis: LBP, SIJ dysfunction, B LE numbness/pain   Prior Hospitalization: see medical history Provider#: [id number]   Medications: Verified on Patient summary List    Comorbidities: HTN, arthritis, recent weight change   Prior Level of Function: Independent with ADLs, functional, and work tasks with no limitations.   Visits from Start of Care: 6    Missed Visits: 1  Reporting Period : 2019 to 2019      Summary of Care:  Updated Goals: to be achieved in 3-4 weeks:  1. Pt will report having no feelings of instability or pain with performing a sit to stand after prolonged sitting to improve safety and ease of transfers. Current: Unable to assess due to attendance 19  PN: reports feeling off balance and has increased pain with sit to stands after prolonged sitting. 2. Pt will demonstrate trunk flexion fingertips to ankles without pain increase to improve ease of ADLs. PN: fingertips to tibial tuberosity with pain  Current: unable to assess due to attendance 19  3. Pt will demonstrate stability ball bridge without back pain to improve co-contraction of core musculature to improve standing activity. PN: back discomfort with SB bridge  Current: unable to assess due to attendance 19    ASSESSMENT/RECOMMENDATIONS: Pt was to call back and inform clinic of plan for continued PT following MD visit.  No instruction received thus will D/C secondary to attendance policy. Unable to further assess goals.     [x]Discontinue therapy: []Patient has reached or is progressing toward set goals      [x]Patient is non-compliant or has abdicated      []Due to lack of appreciable progress towards set goals    Chelsea Taylor DPT, CMTPT 12/30/2019 5:54 PM

## 2020-01-16 NOTE — PROGRESS NOTES
[facility] [address]  [address] [place] [address] [place], [address] 
[phone] fax Physical Therapy Discharge Summary Patient name: Francoise Lance [employer] of Care: 10/24/2019 Referral source: Luigi Barone MD : 1975              
Medical Diagnosis: Low back pain [M54.5] Sacrococcygeal disorders, not elsewhere classified [M53.3] Other muscle spasm [M62.838] Payor: BLUE CROSS / Plan: [facility] PPO / Product Type: PPO /  Onset Date:              
Treatment Diagnosis: LBP, SIJ dysfunction, B LE numbness/pain Prior Hospitalization: see medical history Provider#: [id number] Medications: Verified on Patient summary List  
 Comorbidities: HTN, arthritis, recent weight change 
 Prior Level of Function: Independent with ADLs, functional, and work tasks with no limitations.  
Visits from Start of Care: 6    Missed Visits: 2 Reporting Period : 2019 to 2019 Summary of Care: 
Updated Goals: to be achieved in 3-4 weeks: 1. Pt will report having no feelings of instability or pain with performing a sit to stand after prolonged sitting to improve safety and ease of transfers. PN: reports feeling off balance and has increased pain with sit to stands after prolonged sitting. 2. Pt will demonstrate trunk flexion fingertips to ankles without pain increase to improve ease of ADLs. PN: fingertips to tibial tuberosity with pain 3. Pt will demonstrate stability ball bridge without back pain to improve co-contraction of core musculature to improve standing activity. PN: back discomfort with SB bridge ASSESSMENT/RECOMMENDATIONS: Pt with sporadic attendance of late and requested to call back after MD f/u. No word from patient thus will D/C at this time. Unable to further assess goals since progress note [x]Discontinue therapy: []Patient has reached or is progressing toward set goals [x]Patient is non-compliant or has abdicated 
    []Due to lack of appreciable progress towards set goals Lyric Choe 1/16/2020 8:48 AM

## 2020-07-27 ENCOUNTER — HOSPITAL ENCOUNTER (OUTPATIENT)
Dept: MAMMOGRAPHY | Age: 45
Discharge: HOME | End: 2020-07-27
Attending: FAMILY MEDICINE
Payer: COMMERCIAL

## 2020-07-27 ENCOUNTER — HOSPITAL ENCOUNTER (OUTPATIENT)
Dept: ULTRASOUND IMAGING | Age: 45
Discharge: HOME | End: 2020-07-27
Attending: FAMILY MEDICINE
Payer: COMMERCIAL

## 2020-07-27 DIAGNOSIS — R92.8 ABNORMAL MAMMOGRAM: ICD-10-CM

## 2020-07-27 DIAGNOSIS — Z09 FOLLOW-UP EXAM: ICD-10-CM

## 2020-07-27 DIAGNOSIS — N60.09 BREAST CYST: ICD-10-CM

## 2020-07-27 PROCEDURE — 77061 BREAST TOMOSYNTHESIS UNI: CPT

## 2020-11-03 ENCOUNTER — OFFICE VISIT (OUTPATIENT)
Dept: ORTHOPEDIC SURGERY | Age: 45
End: 2020-11-03
Payer: COMMERCIAL

## 2020-11-03 VITALS
OXYGEN SATURATION: 98 % | WEIGHT: 212 LBS | SYSTOLIC BLOOD PRESSURE: 134 MMHG | RESPIRATION RATE: 16 BRPM | TEMPERATURE: 97.9 F | DIASTOLIC BLOOD PRESSURE: 80 MMHG | HEIGHT: 67 IN | BODY MASS INDEX: 33.27 KG/M2 | HEART RATE: 80 BPM

## 2020-11-03 DIAGNOSIS — M62.838 MUSCLE SPASM: ICD-10-CM

## 2020-11-03 DIAGNOSIS — M54.41 ACUTE MIDLINE LOW BACK PAIN WITH RIGHT-SIDED SCIATICA: Primary | ICD-10-CM

## 2020-11-03 DIAGNOSIS — M53.3 SACROILIAC PAIN: ICD-10-CM

## 2020-11-03 DIAGNOSIS — R79.89 ELEVATED LFTS: ICD-10-CM

## 2020-11-03 PROCEDURE — 99214 OFFICE O/P EST MOD 30 MIN: CPT | Performed by: PHYSICAL MEDICINE & REHABILITATION

## 2020-11-03 RX ORDER — PREGABALIN 75 MG/1
CAPSULE ORAL
Qty: 90 CAP | Refills: 1 | Status: SHIPPED | OUTPATIENT
Start: 2020-11-03 | End: 2020-11-03 | Stop reason: SDUPTHER

## 2020-11-03 RX ORDER — PREGABALIN 75 MG/1
CAPSULE ORAL
Qty: 90 CAP | Refills: 1 | Status: SHIPPED | OUTPATIENT
Start: 2020-11-03 | End: 2021-09-23

## 2020-11-03 RX ORDER — METHYLPREDNISOLONE 4 MG/1
TABLET ORAL
Qty: 1 DOSE PACK | Refills: 0 | Status: SHIPPED | OUTPATIENT
Start: 2020-11-03 | End: 2020-11-03 | Stop reason: SDUPTHER

## 2020-11-03 RX ORDER — METHYLPREDNISOLONE 4 MG/1
TABLET ORAL
Qty: 1 DOSE PACK | Refills: 0 | Status: SHIPPED | OUTPATIENT
Start: 2020-11-03 | End: 2021-09-23

## 2020-11-03 NOTE — PATIENT INSTRUCTIONS
Herniated Disc: Exercises Introduction Here are some examples of exercises for you to try. The exercises may be suggested for a condition or for rehabilitation. Start each exercise slowly. Ease off the exercises if you start to have pain. You will be told when to start these exercises and which ones will work best for you. How to stay safe These exercises can help you move easier and feel better. But when you first start doing them, you may have more pain in your back. This is normal. But it is important to pay close attention to your pain during and after each exercise. · Keep doing these exercises if your pain stays the same or moves from your leg and buttock more toward the middle of your spine. Pain moving out of your leg and buttock is a good sign. · Stop doing these exercises if your pain gets worse in your leg and buttock. Stop if you start to have pain in your leg and buttock that you didn't have before. Be sure to do these exercises in the order they appear. Note how your pain changes before you move to the next one. If your pain is much worse right after exercise and stays worse the next day, do not do any of these exercises. How to do the exercises 1. Rest on belly 1. Lie on your stomach, with your head turned to the side. 2. Try to relax your lower back muscles as much as you can. 3. Continue to lie on your stomach for 2 minutes. · Keep your arms beside your body. · If that position bothers your neck, place your hands, one on top of the other, underneath your forehead. This will help support your head and neck. If lying in this position causes or increases pain down your leg, stop this exercise and do not do the next exercises. 2. Press-up back extension 1. Lie on your stomach, with your face down and your elbows tucked into your sides and under your shoulders. 2. Press your elbows down into the floor to raise your upper back. 3. Hold this position for 15 to 30 seconds. 4. Repeat 2 to 4 times. · As you do this, relax your stomach muscles and allow your back to arch without using your back muscles. · Let your low back relax completely as you arch up. Don't let your hips or pelvis come off the floor. Then relax, and return to the start position. Over time, work up to staying in the press-up position for up to 2 minutes. If lying in this position causes or increases pain down your leg, stop this exercise and do not do the next exercises. 3. Full press-up back extension 1. Lie on your stomach with your face down, keeping your elbows tucked into your sides and under your shoulders. 2. Straighten your elbows, and push your upper body up as far as you can. 3. Hold this position for 5 seconds, and then relax. 4. Repeat 10 times. Allow your lower back to sag. Keep your hips, pelvis, and legs relaxed. Each time, try to raise your upper body a little higher and hold your arms a bit straighter. If lying in this position causes or increases pain down your leg, stop this exercise and do not do the next exercises. If you can't do this exercise, you may instead try the backward bend exercise that follows. 4. Backward bend 1. Stand with your feet hip-width apart, and don't lock your knees. 2. Place your hands in the small of your back. 3. Bend backward as far as you can, keeping your knees straight. 4. Repeat 2 to 4 times. Your toes should be pointing forward. Hold this position for 2 to 3 seconds. Then return to your starting position. Each time, try to bend backward a little farther, until you bend backward as far as you can. If standing in this position causes or increases pain down your leg, stop this exercise. Follow-up care is a key part of your treatment and safety. Be sure to make and go to all appointments, and call your doctor if you are having problems. It's also a good idea to know your test results and keep a list of the medicines you take. Where can you learn more? Go to http://www.gray.com/ Enter 58688 [phone] in the search box to learn more about \"Herniated Disc: Exercises. \" Current as of: March 2, 2020               Content Version: 12.6 © 7526-7547 [employer]. Care instructions adapted under license by [employer] (which disclaims liability or warranty for this information). If you have questions about a medical condition or this instruction, always ask your healthcare professional. [place] [address] any warranty or liability for your use of this information.

## 2020-11-03 NOTE — LETTER
11/4/20 Patient: Juve Vazquez YOB: 1975 Date of Visit: 11/3/2020 241 Gabe Sibley MD 
[address] [place] [address] [facility] [address] VIA Facsimile: [phone] Dear 241 Gabe Sibley MD, Thank you for referring Ms. Bj Zaman to [address] [place] [address] for evaluation. My notes for this consultation are attached. If you have questions, please do not hesitate to call me. I look forward to following your patient along with you. Sincerely, Blanquita Kaplan MD

## 2020-11-04 ENCOUNTER — TELEPHONE (OUTPATIENT)
Dept: ORTHOPEDIC SURGERY | Age: 45
End: 2020-11-04

## 2020-11-04 NOTE — TELEPHONE ENCOUNTER
PA is required for Pregabalin 75mg    Cover My Meds [place] Palomo Garber: [id number] - Rx #: [id number]

## 2020-11-17 ENCOUNTER — TRANSCRIBE ORDER (OUTPATIENT)
Dept: SCHEDULING | Age: 45
End: 2020-11-17

## 2020-11-17 DIAGNOSIS — Z12.31 VISIT FOR SCREENING MAMMOGRAM: Primary | ICD-10-CM

## 2021-09-23 ENCOUNTER — HOSPITAL ENCOUNTER (EMERGENCY)
Age: 46
Discharge: HOME | End: 2021-09-23
Attending: STUDENT IN AN ORGANIZED HEALTH CARE EDUCATION/TRAINING PROGRAM
Payer: COMMERCIAL

## 2021-09-23 ENCOUNTER — APPOINTMENT (OUTPATIENT)
Dept: ULTRASOUND IMAGING | Age: 46
End: 2021-09-23
Attending: EMERGENCY MEDICINE
Payer: COMMERCIAL

## 2021-09-23 VITALS
RESPIRATION RATE: 15 BRPM | WEIGHT: 215 LBS | BODY MASS INDEX: 33.74 KG/M2 | OXYGEN SATURATION: 98 % | HEIGHT: 67 IN | TEMPERATURE: 98.3 F | SYSTOLIC BLOOD PRESSURE: 140 MMHG | DIASTOLIC BLOOD PRESSURE: 80 MMHG | HEART RATE: 78 BPM

## 2021-09-23 DIAGNOSIS — I88.9 INGUINAL LYMPHADENITIS: Primary | ICD-10-CM

## 2021-09-23 LAB
ALBUMIN SERPL-MCNC: 4.6 G/DL (ref 3.4–5)
ALBUMIN/GLOB SERPL: 1.4 {RATIO} (ref 0.8–1.7)
ALP SERPL-CCNC: 115 U/L (ref 45–117)
ALT SERPL-CCNC: 128 U/L (ref 13–56)
ANION GAP SERPL CALC-SCNC: 10 MMOL/L (ref 3–18)
APPEARANCE UR: ABNORMAL
AST SERPL-CCNC: 71 U/L (ref 10–38)
BACTERIA URNS QL MICRO: ABNORMAL /HPF
BASOPHILS # BLD: 0.1 K/UL (ref 0–0.1)
BASOPHILS NFR BLD: 1 % (ref 0–2)
BILIRUB SERPL-MCNC: 0.4 MG/DL (ref 0.2–1)
BILIRUB UR QL: NEGATIVE
BUN SERPL-MCNC: 14 MG/DL (ref 7–18)
BUN/CREAT SERPL: 20 (ref 12–20)
CALCIUM SERPL-MCNC: 9 MG/DL (ref 8.5–10.1)
CHLORIDE SERPL-SCNC: 101 MMOL/L (ref 100–111)
CO2 SERPL-SCNC: 28 MMOL/L (ref 21–32)
COLOR UR: YELLOW
CREAT SERPL-MCNC: 0.7 MG/DL (ref 0.6–1.3)
DIFFERENTIAL METHOD BLD: ABNORMAL
EOSINOPHIL # BLD: 0.6 K/UL (ref 0–0.4)
EOSINOPHIL NFR BLD: 5 % (ref 0–5)
EPITH CASTS URNS QL MICRO: ABNORMAL /LPF (ref 0–5)
ERYTHROCYTE [DISTWIDTH] IN BLOOD BY AUTOMATED COUNT: 13.2 % (ref 11.6–14.5)
GLOBULIN SER CALC-MCNC: 3.4 G/DL (ref 2–4)
GLUCOSE SERPL-MCNC: 110 MG/DL (ref 74–99)
GLUCOSE UR STRIP.AUTO-MCNC: >1000 MG/DL
HCG SERPL QL: ABNORMAL
HCT VFR BLD AUTO: 40.1 % (ref 35–45)
HGB BLD-MCNC: 13.3 G/DL (ref 12–16)
HGB UR QL STRIP: ABNORMAL
KETONES UR QL STRIP.AUTO: ABNORMAL MG/DL
LEUKOCYTE ESTERASE UR QL STRIP.AUTO: ABNORMAL
LYMPHOCYTES # BLD: 3.7 K/UL (ref 0.9–3.6)
LYMPHOCYTES NFR BLD: 33 % (ref 21–52)
MCH RBC QN AUTO: 27.5 PG (ref 24–34)
MCHC RBC AUTO-ENTMCNC: 33.2 G/DL (ref 31–37)
MCV RBC AUTO: 83 FL (ref 78–100)
MONOCYTES # BLD: 1 K/UL (ref 0.05–1.2)
MONOCYTES NFR BLD: 8 % (ref 3–10)
NEUTS SEG # BLD: 6 K/UL (ref 1.8–8)
NEUTS SEG NFR BLD: 53 % (ref 40–73)
NITRITE UR QL STRIP.AUTO: NEGATIVE
PH UR STRIP: 6 [PH] (ref 5–8)
PLATELET # BLD AUTO: 317 K/UL (ref 135–420)
PMV BLD AUTO: 9.8 FL (ref 9.2–11.8)
POTASSIUM SERPL-SCNC: 3.7 MMOL/L (ref 3.5–5.5)
PROT SERPL-MCNC: 8 G/DL (ref 6.4–8.2)
PROT UR STRIP-MCNC: ABNORMAL MG/DL
RBC # BLD AUTO: 4.83 M/UL (ref 4.2–5.3)
RBC #/AREA URNS HPF: ABNORMAL /HPF (ref 0–5)
SODIUM SERPL-SCNC: 139 MMOL/L (ref 136–145)
SP GR UR REFRACTOMETRY: >1.03 (ref 1–1.03)
UROBILINOGEN UR QL STRIP.AUTO: 1 EU/DL (ref 0.2–1)
WBC # BLD AUTO: 11.4 K/UL (ref 4.6–13.2)
WBC URNS QL MICRO: ABNORMAL /HPF (ref 0–4)

## 2021-09-23 PROCEDURE — 76856 US EXAM PELVIC COMPLETE: CPT

## 2021-09-23 PROCEDURE — 99283 EMERGENCY DEPT VISIT LOW MDM: CPT

## 2021-09-23 PROCEDURE — 80053 COMPREHEN METABOLIC PANEL: CPT

## 2021-09-23 PROCEDURE — 84703 CHORIONIC GONADOTROPIN ASSAY: CPT

## 2021-09-23 PROCEDURE — 85025 COMPLETE CBC W/AUTO DIFF WBC: CPT

## 2021-09-23 PROCEDURE — 81001 URINALYSIS AUTO W/SCOPE: CPT

## 2021-09-23 NOTE — ED NOTES
Patient care assumed from [facility]. Briefly 58-year-old female with hypertension and hyperlipidemia presenting with right inguinal lymphadenopathy for the past 2 weeks. Painful to touch. No radiation of pain. No urinary symptoms. She is afebrile with stable vitals here. Blood work obtained thus far is grossly unremarkable. Pending urinalysis as well as formal interpretation of ultrasound that was obtained. 1847:  Patient's ultrasound unremarkable for any acute finding other than for enlarged lymph nodes consistent with lymphadenopathy. Patient will be discharged home to care for lymphadenopathy with NSAIDs and Tylenol. This is been discussed with the patient who verbalizes understanding agreement with plan. Patient stable for discharge.

## 2021-09-23 NOTE — ED PROVIDER NOTES
EMERGENCY DEPARTMENT HISTORY AND PHYSICAL EXAM    Date: 9/23/2021  Patient Name: Kari Plaza    History of Presenting Illness     Chief Complaint   Patient presents with    Gland Swelling         History Provided By: Patient    Additional History (Context): Kari Plaza is a 51 y.o. female with hypertension and hyperlipidemia who presents with right inguinal swelling x 1-2 weeks. Is very painful. Pain does not radiate but remains localized to the right groin and is swollen. Denies vaginal discharge, dysuria, rash. PCP: Raza Forrest MD    Current Outpatient Medications   Medication Sig Dispense Refill    losartan-hydroCHLOROthiazide (HYZAAR) 100-12.5 mg per tablet Take 1 Tab by mouth daily.  atorvastatin (LIPITOR) 20 mg tablet Take 20 mg by mouth daily.  aspirin delayed-release 81 mg tablet Take  by mouth daily.  Calcium-Cholecalciferol, D3, 600 mg(1,500mg) -400 unit cap Take  by mouth.  amLODIPine (NORVASC) 5 mg tablet Take 5 mg by mouth daily.  metoprolol succinate (TOPROL-XL) 100 mg XL tablet 100 mg daily. Past History     Past Medical History:  Past Medical History:   Diagnosis Date    Female stress incontinence     HTN (hypertension)     Hypercholesterolemia        Past Surgical History:  Past Surgical History:   Procedure Laterality Date    HX BLADDER SUSPENSION  10-04-11    [facility], Cysto, ADJUST urethral sling placement, Dr Candance Bandy HX HYSTERECTOMY  2009    Partial, Ovaries left behind    HX OTHER SURGICAL  1979    ureteral stent placement, ? why     [place] [facility] ABDOMEN      oopherectimy       Family History:  Family History   Problem Relation Age of Onset    Thyroid Disease Mother     Hypertension Father        Social History:  Social History     Tobacco Use    Smoking status: Never Smoker    Smokeless tobacco: Never Used   Substance Use Topics    Alcohol use: Yes     Comment: on occ.  Drug use: No       Allergies:   Allergies Allergen Reactions    Meperidine Other (comments)     GI upset  Other reaction(s): gi distress  Hives. Review of Systems   Review of Systems   Constitutional: Negative. HENT: Negative. Eyes: Negative. Respiratory: Negative. Cardiovascular: Negative. Gastrointestinal: Negative. Endocrine: Negative. Genitourinary: Positive for pelvic pain. Negative for dysuria and vaginal discharge. Musculoskeletal: Negative. Skin: Negative. Allergic/Immunologic: Negative. Neurological: Negative. Hematological: Positive for adenopathy. Psychiatric/Behavioral: Negative. All Other Systems Negative  Physical Exam     Vitals:    09/23/21 1515   BP: (!) 147/87   Pulse: 87   Resp: 18   Temp: 98.3 °F (36.8 °C)   SpO2: 97%   Weight: 97.5 kg (215 lb)   Height: 5' 7\" (1.702 m)     Physical Exam  Vitals and nursing note reviewed. Constitutional:       Appearance: She is well-developed. HENT:      Head: Normocephalic and atraumatic. Right Ear: External ear normal.      Left Ear: External ear normal.      Nose: Nose normal.   Eyes:      Conjunctiva/sclera: Conjunctivae normal.      Pupils: Pupils are equal, round, and reactive to light. Neck:      Vascular: No JVD. Trachea: No tracheal deviation. Cardiovascular:      Rate and Rhythm: Normal rate and regular rhythm. Heart sounds: Normal heart sounds. No murmur heard. No friction rub. No gallop. Pulmonary:      Effort: Pulmonary effort is normal. No respiratory distress. Breath sounds: Normal breath sounds. No wheezing or rales. Abdominal:      General: Bowel sounds are normal. There is no distension. Palpations: Abdomen is soft. There is no mass. Tenderness: There is no abdominal tenderness. There is no guarding or rebound. Genitourinary:     Comments: Marked right inguinal lymphadenopathy TTP  Musculoskeletal:         General: No tenderness. Normal range of motion.       Cervical back: Normal range of motion and neck supple. Skin:     General: Skin is warm and dry. Findings: No rash. Neurological:      Mental Status: She is alert and oriented to person, place, and time. Cranial Nerves: No cranial nerve deficit. Deep Tendon Reflexes: Reflexes are normal and symmetric. Psychiatric:         Behavior: Behavior normal.            Diagnostic Study Results     Labs -     Recent Results (from the past 12 hour(s))   CBC WITH AUTOMATED DIFF    Collection Time: 09/23/21  3:40 PM   Result Value Ref Range    WBC 11.4 4.6 - 13.2 K/uL    RBC 4.83 4.20 - 5.30 M/uL    HGB 13.3 12.0 - 16.0 g/dL    HCT 40.1 35.0 - 45.0 %    MCV 83.0 78.0 - 100.0 FL    MCH 27.5 24.0 - 34.0 PG    MCHC 33.2 31.0 - 37.0 g/dL    RDW 13.2 11.6 - 14.5 %    PLATELET 338 833 - 866 K/uL    MPV 9.8 9.2 - 11.8 FL    NEUTROPHILS 53 40 - 73 %    LYMPHOCYTES 33 21 - 52 %    MONOCYTES 8 3 - 10 %    EOSINOPHILS 5 0 - 5 %    BASOPHILS 1 0 - 2 %    ABS. NEUTROPHILS 6.0 1.8 - 8.0 K/UL    ABS. LYMPHOCYTES 3.7 (H) 0.9 - 3.6 K/UL    ABS. MONOCYTES 1.0 0.05 - 1.2 K/UL    ABS. EOSINOPHILS 0.6 (H) 0.0 - 0.4 K/UL    ABS. BASOPHILS 0.1 0.0 - 0.1 K/UL    DF AUTOMATED     METABOLIC PANEL, COMPREHENSIVE    Collection Time: 09/23/21  3:40 PM   Result Value Ref Range    Sodium 139 136 - 145 mmol/L    Potassium 3.7 3.5 - 5.5 mmol/L    Chloride 101 100 - 111 mmol/L    CO2 28 21 - 32 mmol/L    Anion gap 10 3.0 - 18 mmol/L    Glucose 110 (H) 74 - 99 mg/dL    BUN 14 7.0 - 18 MG/DL    Creatinine 0.70 0.6 - 1.3 MG/DL    BUN/Creatinine ratio 20 12 - 20      GFR est AA >60 >60 ml/min/1.73m2    GFR est non-AA >60 >60 ml/min/1.73m2    Calcium 9.0 8.5 - 10.1 MG/DL    Bilirubin, total 0.4 0.2 - 1.0 MG/DL    ALT (SGPT) 128 (H) 13 - 56 U/L    AST (SGOT) 71 (H) 10 - 38 U/L    Alk.  phosphatase 115 45 - 117 U/L    Protein, total 8.0 6.4 - 8.2 g/dL    Albumin 4.6 3.4 - 5.0 g/dL    Globulin 3.4 2.0 - 4.0 g/dL    A-G Ratio 1.4 0.8 - 1.7     HCG QL SERUM    Collection Time: 09/23/21  3:40 PM   Result Value Ref Range    HCG, Ql. INTERMEDIATE (A) NEG         Radiologic Studies -   US PELV NON OB W TV W DOPPLER    (Results Pending)     CT Results  (Last 48 hours)    None        CXR Results  (Last 48 hours)    None            Medical Decision Making   I am the first provider for this patient. I reviewed the vital signs, available nursing notes, past medical history, past surgical history, family history and social history. Vital Signs-Reviewed the patient's vital signs. Records Reviewed: Nursing Notes    Procedures:  Procedures    Provider Notes (Medical Decision Making): CT scan 2015 confirms prior hysterectomy and prior right inguinal lymphadenopathy. Get US as pt has had numerous ovarian cysts, pathology. Will add urine; consider CT scan pending US findings. No distal rash/lesion/wound seen. 4:58 PM : Pt care transferred to Dr. Paige Schneider ,ED provider. History of patient complaint(s), available diagnostic reports and current treatment plan has been discussed thoroughly. Bedside rounding on patient occured : yes . Intended disposition of patient : home  Pending diagnostics reports and/or labs (please list): US, labs    MED RECONCILIATION:  No current facility-administered medications for this encounter. Current Outpatient Medications   Medication Sig    losartan-hydroCHLOROthiazide (HYZAAR) 100-12.5 mg per tablet Take 1 Tab by mouth daily.  atorvastatin (LIPITOR) 20 mg tablet Take 20 mg by mouth daily.  aspirin delayed-release 81 mg tablet Take  by mouth daily.  Calcium-Cholecalciferol, D3, 600 mg(1,500mg) -400 unit cap Take  by mouth.  amLODIPine (NORVASC) 5 mg tablet Take 5 mg by mouth daily.  metoprolol succinate (TOPROL-XL) 100 mg XL tablet 100 mg daily. Disposition:  TBD      Follow-up Information    None         Current Discharge Medication List              Diagnosis     Clinical Impression:   1.  Inguinal lymphadenitis

## 2021-09-23 NOTE — ED NOTES
20 ga PIV inserted into LAC, flushed easily with 10ml of NS. Pt tolerated well. Labs drawn, labeled and sent to lab. Pt updated on poc. Denies any needs at this time. Call bell in reach.

## 2024-07-18 ENCOUNTER — NEW PATIENT (OUTPATIENT)
Dept: RURAL CLINIC 1 | Facility: CLINIC | Age: 49
End: 2024-07-18

## 2024-07-18 DIAGNOSIS — H25.13: ICD-10-CM

## 2024-07-18 DIAGNOSIS — E11.9: ICD-10-CM

## 2024-07-18 PROCEDURE — 92004 COMPRE OPH EXAM NEW PT 1/>: CPT

## 2024-07-18 ASSESSMENT — VISUAL ACUITY
OU_SC: 20/20
OD_CC: 20/20
OD_SC: 20/25
OU_CC: 20/20
OS_SC: 20/20
OS_CC: 20/20

## 2024-07-18 ASSESSMENT — TONOMETRY
OD_IOP_MMHG: 14
OS_IOP_MMHG: 15

## 2024-12-05 NOTE — PROGRESS NOTES
[facility]  [address], [facility] [place] [facility] [place] [address]  Phone: [phone]  Fax: [phone]    Pt's YOB: 1975    ASSESSMENT   Diagnoses and all orders for this visit:    1. Acute midline low back pain with right-sided sciatica  -     pregabalin (Lyrica) 75 mg capsule; Take 1 cap by mouth in the morning and 2 at night as directed. -     methylPREDNISolone (MEDROL DOSEPACK) 4 mg tablet; Per dose pack instructions    2. Muscle spasm    3. Sacroiliac pain    4. Elevated LFTs    5. Body mass index (BMI) 33.0-33.9, adult         IMPRESSION AND PLAN:  Apolinar Lan is a 54 y.o. female with history of lumbar pain. She reports progressive pain in the lower back and right buttock that radiates down the right leg to the foot since her last office visit. Pt has previously attended physical therapy and she notes minimal relief with Neurontin. 1) Pt was given information on herniated disc exercises. 2) She was prescribed Lyrica 75 mg 1 cap QAM and 2 caps QHS, tapering up as directed. 3) Discussed trial of Cymbalta if needed. 4) Pt was prescribed a Medrol Dosepak. 5) She was counseled on proper lifting mechanics. 6) Discussed ordering a lumbar MRI pending response to medication and HEP(she previously has had PT). 7) Ms. Dina Lucero has a reminder for a \"due or due soon\" health maintenance. I have asked that she contact her primary care provider, Sheri More MD, for follow-up on this health maintenance. 8)  demonstrated consistency with prescribing. 9) Weight loss recommended  Follow-up and Dispositions    · Return in about 4 weeks (around 12/1/2020) for Medication follow up. HISTORY OF PRESENT ILLNESS:  Apolinar Lan is a 54 y.o. female with history of lumbar pain and presents to the office today for follow up and was last seen on 10/15/2019.  She reports progressive pain in the lower back and right buttock that radiates down the right leg to the foot since her last office visit. Pt admits to weakness in the right leg but denies dragging the leg. She notes severe pain when laying supine and on her right side so she generally lays on her left side in bed. Pt notes that her  had liver and kidney cancer and passed away since her last office visit so missed her follow up appointment. She admits that her pain increased around 8/2020 when she spent a significant amount of time in the hospital with her . 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 She has previously attended physical therapy. Pt admits to improvement in her ambulation and leg pain following the surgery. She reports minimal relief with Neurontin but she denies ever raking Lyrica. Pt notes that she is not currently on antidepressants. She reports minimal relief with Aleve and denies recently taking oral steroids. Pt has a history of elevated LFTs. Pt at this time desires to proceed with medication evaluation. Pain Scale: 10 - Worst pain ever/10    PCP: Felipa Stewart MD     Past Medical History:   Diagnosis Date    Female stress incontinence     HTN (hypertension)     Hypercholesterolemia         Social History     Socioeconomic History    Marital status:      Spouse name: Not on file    Number of children: Not on file    Years of education: Not on file    Highest education level: Not on file   Occupational History    Not on file   Social Needs    Financial resource strain: Not on file    Food insecurity     Worry: Not on file     Inability: Not on file    Transportation needs     Medical: Not on file     Non-medical: Not on file   Tobacco Use    Smoking status: Never Smoker    Smokeless tobacco: Never Used   Substance and Sexual Activity    Alcohol use: Yes     Comment: on occ.      Drug use: No    Sexual activity: Not on file   Lifestyle    Physical activity     Days per week: Not on file     Minutes per session: Not on file    Stress: Not on file   Relationships    Social connections     Talks on phone: Not on file     Gets together: Not on file     Attends [employer] service: Not on file     Active member of club or organization: Not on file     Attends meetings of clubs or organizations: Not on file     Relationship status: Not on file    Intimate partner violence     Fear of current or ex partner: Not on file     Emotionally abused: Not on file     Physically abused: Not on file     Forced sexual activity: Not on file   Other Topics Concern    Not on file   Social History Narrative    Not on file       Current Outpatient Medications   Medication Sig Dispense Refill    pregabalin (Lyrica) 75 mg capsule Take 1 cap by mouth in the morning and 2 at night as directed. 90 Cap 1    methylPREDNISolone (MEDROL DOSEPACK) 4 mg tablet Per dose pack instructions 1 Dose Pack 0    losartan-hydroCHLOROthiazide (HYZAAR) 100-12.5 mg per tablet Take 1 Tab by mouth daily.  atorvastatin (LIPITOR) 20 mg tablet Take 20 mg by mouth daily.  aspirin delayed-release 81 mg tablet Take  by mouth daily.  Calcium-Cholecalciferol, D3, 600 mg(1,500mg) -400 unit cap Take  by mouth.  amLODIPine (NORVASC) 5 mg tablet Take 5 mg by mouth daily.  metoprolol succinate (TOPROL-XL) 100 mg XL tablet 100 mg daily. Allergies   Allergen Reactions    Meperidine Other (comments)     GI upset  Other reaction(s): gi distress  Hives. REVIEW OF SYSTEMS    Constitutional: Negative for fever, chills, or weight change. Respiratory: Negative for cough or shortness of breath. Cardiovascular: Negative for chest pain or palpitations. Gastrointestinal: Negative for acid reflux, change in bowel habits, or constipation. Genitourinary: Negative for dysuria and flank pain.    Musculoskeletal: Positive for lumbar and right leg pain.  Skin: Negative for rash. Neurological: Negative for headaches, dizziness, or numbness. Endo/Heme/Allergies: Negative for increased bruising. Psychiatric/Behavioral: Negative for difficulty with sleep. As per HPI    PHYSICAL EXAMINATION  Visit Vitals  /80 (BP 1 Location: Right arm, BP Patient Position: Sitting)   Pulse 80   Temp 97.9 °F (36.6 °C) (Skin)   Resp 16   Ht 5' 7\" (1.702 m)   Wt 212 lb (96.2 kg)   SpO2 98%   BMI 33.20 kg/m²       Constitutional: Awake, alert, and in no acute distress; slightly tearful when discussing her 's recent passing away. Neurological: 1+ symmetrical DTRs in the upper extremities. 1+ symmetrical DTRs in the lower extremities. Sensation to light touch is intact. Negative Hahn's sign bilaterally. Skin: warm, dry, and intact. Musculoskeletal: Tenderness to palpation in the lower lumbar region, R>L. Tenderness to palpation over the right sacroiliac joint. Moderate pain with extension, axial loading, and forward flexion. No pain with internal or external rotation of her hips. Negative straight leg raise bilaterally. Biceps  Triceps Deltoids Wrist Ext Wrist Flex Hand Intrin   Right +4/5 +4/5 +4/5 +4/5 +4/5 +4/5   Left +4/5 +4/5 +4/5 +4/5 +4/5 +4/5      Hip Flex  Quads Hamstrings Ankle DF EHL Ankle PF   Right +4/5 +4/5 +4/5 +4/5 +4/5 +4/5   Left +4/5 +4/5 +4/5 +4/5 +4/5 +4/5     IMAGING:    Lumbar spine MRI from 10/24/2019 was personally reviewed with the patient and demonstrated:  FINDINGS: Numbering of the lumbar vertebra is identical to prior. Stable trace  retrolisthesis of L4 on L5. Increased amount of chronic T1 hyperintense  degenerative related marrow changes along the L4-5 endplates. The visualized  conus medullaris is normal in signal and caliber ending at the T12-L1 disc  level. Cauda equina nerve roots are unremarkable.  Paraspinal soft tissues are  unremarkable.     Multiseptated and multicystic right adnexal mass measuring 10.7 x 8.0 cm in the  coronal plane. Only partially imaged in the axial plane measuring at least 7.5  cm AP.     Spinal canal and neural foramen:      T12-L1: Normal looking disc. No spinal canal or foraminal stenosis.     L1-2: Much smaller, tiny residual left central disc protrusion which minimally  indents the ventral thecal sac. No spinal canal or foraminal stenosis.     L2-3: Normal looking disc. No spinal canal or foraminal stenosis.     L3-4: Disc is desiccated with preserved height. Small right foraminal disc  protrusion and mild facet hypertrophy producing mild right foraminal stenosis,  essentially stable. No exiting nerve root compression. No spinal canal or left  foraminal stenosis.     L4-5: Disc is desiccated and mildly narrowed. Mild disc bulge and facet  hypertrophy. The superimposed right central disc extrusion is significantly  smaller and essentially resolved. No spinal canal or foraminal stenosis.     L5-S1: Normal looking disc. No spinal canal or foraminal stenosis. IMPRESSION:     Intervally smaller tiny residual left central disc protrusion at L1-2 which does  not cause spinal stenosis or neural compression.     Residual disc bulge however essentially resolved right central disc extrusion at  L4-5 with no residual spinal canal stenosis.     No new disc herniation.     Partially [id number] cm multiseptated and multicystic right adnexal mass. Ultrasound or MRI correlation is recommended. Written by Ana Multani, as dictated by Tom Shen MD.  I, Dr. Tom Shen confirm that all documentation is accurate. Initial (On Arrival)

## 2024-12-10 NOTE — LETTER
10/17/19 Patient: Radha Thayer YOB: 1975 Date of Visit: 10/15/2019 241 Gabe Sibley MD 
[address] [place] [address] [address] [place], [place] [address] VIA Facsimile: [phone] Dear 241 Gabe Sibley MD, Thank you for referring Ms. Isabela Wilcox to [address] [place] [address] for evaluation. My notes for this consultation are attached. If you have questions, please do not hesitate to call me. I look forward to following your patient along with you. Sincerely, Jules Sepulveda MD 
 
 Mild